# Patient Record
Sex: MALE | NOT HISPANIC OR LATINO | Employment: STUDENT | URBAN - METROPOLITAN AREA
[De-identification: names, ages, dates, MRNs, and addresses within clinical notes are randomized per-mention and may not be internally consistent; named-entity substitution may affect disease eponyms.]

---

## 2019-04-29 ENCOUNTER — HOSPITAL ENCOUNTER (OUTPATIENT)
Dept: GENERAL RADIOLOGY | Facility: HOSPITAL | Age: 19
Discharge: HOME | End: 2019-04-29
Admitting: INTERNAL MEDICINE

## 2019-04-29 ENCOUNTER — OFFICE VISIT (OUTPATIENT)
Dept: INTERNAL MEDICINE | Age: 19
End: 2019-04-29

## 2019-04-29 VITALS
HEIGHT: 70 IN | SYSTOLIC BLOOD PRESSURE: 112 MMHG | OXYGEN SATURATION: 99 % | BODY MASS INDEX: 25.77 KG/M2 | DIASTOLIC BLOOD PRESSURE: 78 MMHG | TEMPERATURE: 99 F | HEART RATE: 70 BPM | WEIGHT: 180 LBS

## 2019-04-29 DIAGNOSIS — M25.551 PAIN OF BOTH HIP JOINTS: Primary | ICD-10-CM

## 2019-04-29 DIAGNOSIS — M25.552 PAIN OF BOTH HIP JOINTS: Primary | ICD-10-CM

## 2019-04-29 PROCEDURE — 73521 X-RAY EXAM HIPS BI 2 VIEWS: CPT

## 2019-04-29 PROCEDURE — 99203 OFFICE O/P NEW LOW 30 MIN: CPT | Performed by: INTERNAL MEDICINE

## 2019-04-29 NOTE — PATIENT INSTRUCTIONS
** IMPORTANT MESSAGE FROM DR. ADKINS **    In our office, your satisfaction is VERY important to us.     You may receive a survey from [facility] by mail or E-mail for you to provide feedback about your visit. This information is invaluable for me to know what we can do to improve our services.     I ask that you please take a few minutes to complete the survey and let us know how we are doing in serving your needs. (You may receive the survey more than once for multiple visits)    Thank You !    Dr. Adkins & Staff    _____________________________________________________________________

## 2019-04-29 NOTE — PROGRESS NOTES
"[facility]  CHARLEEN ADKINS M.D.      Ady Grimes / 18 y.o. / male  04/29/2019      ASSESSMENT & PLAN:    Problem List Items Addressed This Visit     None      Visit Diagnoses     Pain of both hip joints    -  Primary    Relevant Orders    XR Hips Bilateral With or Without Pelvis 2 View        Orders Placed This Encounter   Procedures   • XR Hips Bilateral With or Without Pelvis 2 View     No orders of the defined types were placed in this encounter.      Summary/Discussion:  Check xray of bilateral hips  Avoid lower body resistance workout x 2 weeks  Take Aleve 2 BID x 5-7 days  Consider PT if not improving    Next Appointment with me: 10/23/2019    Return for worsening problem or if not improving, Annual physical.    ____________________________________________________________________    VITALS:    Visit Vitals  /78   Pulse 70   Temp 99 °F (37.2 °C)   Ht 177.8 cm (70\")   Wt 81.6 kg (180 lb)   SpO2 99%   BMI 25.83 kg/m²       BP Readings from Last 3 Encounters:   04/29/19 112/78     Wt Readings from Last 3 Encounters:   04/29/19 81.6 kg (180 lb)      Body mass index is 25.83 kg/m².    CC: Main reason(s) for today's visit: Establish Care and Leg Pain (6 months, no medications)      HPI:    Patient is a 18 y.o. male who is here to establish care. Primary complains of right anterior hip pain x 6 months and to lesser degree on the left. Works out regularly including lower body weight lifting and also plays soccer on regular basis. Denies overt history of injury or trauma. Pain is intermittent but occurs daily. Severity is mild to moderate. Externally rotating the hip seems to cause discomfort. No noticeable hernia or bulging of the groin area.       Patient Care Team:  Gerard Adkins MD as PCP - General (Internal Medicine)  ____________________________________________________________________      REVIEW OF SYSTEMS    Review of Systems   Constitutional: Negative.    HENT: Negative.    Eyes: Negative.  "   Respiratory: Negative.    Cardiovascular: Negative.    Gastrointestinal: Negative.    Endocrine: Negative.    Genitourinary: Negative.    Musculoskeletal: Negative.  Negative for back pain and joint swelling. Arthralgias: olive hip pain (r>l)   Skin: Negative.    Allergic/Immunologic: Negative.    Neurological: Negative.    Hematological: Negative.    Psychiatric/Behavioral: Negative.        PHYSICAL EXAMINATION    Physical Exam   Constitutional: He appears well-nourished. No distress.   HENT:   Head: Normocephalic.   Right Ear: Tympanic membrane normal.   Left Ear: Tympanic membrane normal.   Mouth/Throat: Oropharynx is clear and moist. No oral lesions.   Eyes: Conjunctivae are normal. No scleral icterus.   Neck: Neck supple. No tracheal deviation present. No thyromegaly present.   Cardiovascular: Normal rate, regular rhythm, normal heart sounds and intact distal pulses. Exam reveals no gallop and no friction rub.   No murmur heard.  Pulmonary/Chest: Effort normal and breath sounds normal.   Abdominal: Soft. Bowel sounds are normal. He exhibits no distension and no mass. There is no tenderness. No hernia.   Musculoskeletal: He exhibits no edema or deformity.        Right hip: He exhibits normal range of motion, normal strength, no tenderness (mild right anterior inguinal region), no swelling and no crepitus.        Left hip: Normal.        Right knee: Normal.        Left knee: Normal.   Right anterior hip pain with external rotation of the hip   Lymphadenopathy:     He has no cervical adenopathy.        Right: No supraclavicular adenopathy present.        Left: No supraclavicular adenopathy present.   Neurological: He is alert. He has normal reflexes.   Skin: Skin is intact. No rash noted. No erythema. No pallor.   No jaundice   Psychiatric: He has a normal mood and affect. His behavior is normal. Judgment and thought content normal. Cognition and memory are normal.         REVIEWED DATA:    Labs:   No results  found for: NA, K, CALCIUM, AST, ALT, BUN, CREATININE, EGFRIFNONA, EGFRIFAFRI    No results found for: GLUCOSE, HGBA1C, MICROALBUR    No results found for: LDL, HDL, TRIG, CHOLHDLRATIO    No results found for: TSH, FREET4     No results found for: WBC, HGB, PLT      Imaging:        Medical Tests:        Summary of old records / correspondence / consultant report:        Request outside records:        ______________________________________________________________________    ALLERGIES  No Known Allergies     MEDICATIONS  No current outpatient medications on file.     No current facility-administered medications for this visit.        PFSH:     The following portions of the patient's history were reviewed and updated as appropriate: Allergies / Current Medications / Past Medical History / Surgical History / Social History / Family History    PROBLEM LIST   There is no problem list on file for this patient.      PAST MEDICAL HISTORY  History reviewed. No pertinent past medical history.    SURGICAL HISTORY  Past Surgical History:   Procedure Laterality Date   • ADENOIDECTOMY         SOCIAL HISTORY  Social History     Socioeconomic History   • Marital status: Single     Spouse name: Not on file   • Number of children: Not on file   • Years of education: Not on file   • Highest education level: Not on file   Occupational History   • Occupation: [employer]/ student   Tobacco Use   • Smoking status: Current Every Day Smoker     Types: Cigarettes, Electronic Cigarette   • Smokeless tobacco: Never Used   • Tobacco comment: hookah pipe   Substance and Sexual Activity   • Alcohol use: No     Frequency: Never   • Drug use: No   • Sexual activity: No       FAMILY HISTORY  Family History   Problem Relation Age of Onset   • Hypertension Father    • Diabetes Brother    • Lung cancer Paternal Grandmother          **Bony Disclaimer:   Much of this encounter note is an electronic transcription/translation of spoken language to  printed text. The electronic translation of spoken language may permit erroneous, or at times, nonsensical words or phrases to be inadvertently transcribed. Although I have reviewed the note for such errors, some may still exist.       Template created by Fox Tong MD

## 2019-04-30 NOTE — PROGRESS NOTES
Discussed result with patient by phone. 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 Discussed with patient. Avoid work out activities that may aggravate hip pain. Call if not improving or worsening. Suggested PT if needed.   Take Rayshawn LOZANO.

## 2019-08-22 NOTE — PROGRESS NOTES
"Ady Grimes / 18 y.o. / male  Encounter Date: 08/23/2019    ASSESSMENT & PLAN:    Problem List Items Addressed This Visit     None      Visit Diagnoses     Acute hip pain, right    -  Primary    Relevant Orders    Ambulatory Referral to Physical Therapy Evaluate and treat (Completed)    Ambulatory Referral to Orthopedic Surgery        Orders Placed This Encounter   Procedures   • Ambulatory Referral to Physical Therapy Evaluate and treat   • Ambulatory Referral to Orthopedic Surgery     No orders of the defined types were placed in this encounter.      Summary/Discussion:  1. Instructed patient that he can use OTC NSAIDs for acute pain relief, along with rest and avoidance of activities that exacerbate pain. Referral placed for outpatient PT with the goal of pain management and return to sports/activities.   2. Referral placed for ortho evaluation, pending response to PT may cancel this.     Return if symptoms worsen or fail to improve, for Next scheduled follow up.  ________________________________________________________________    VITALS:    Visit Vitals  /80   Pulse 70   Temp 97.4 °F (36.3 °C) (Temporal)   Ht 177.8 cm (70\")   Wt 83.5 kg (184 lb)   SpO2 98%   BMI 26.40 kg/m²       BP Readings from Last 3 Encounters:   08/23/19 120/80   04/29/19 112/78     Wt Readings from Last 3 Encounters:   08/23/19 83.5 kg (184 lb)   04/29/19 81.6 kg (180 lb)      Body mass index is 26.4 kg/m².    CC: Main reason(s) for today's visit: Hip Pain      HPI    Patient is a 18 y.o. male who is here for R hip pain, >1 year. No acute injury or trama to the area known.  He is a new patient to me. This is a new problem to me. The pain is located in the anterior groin area on KEYANA hips, worse on the R than the L and exacerbated with movement, especially running. The pain is generally described as a constant ache, but when running or participating in high intensity activities, it is sharp and radiates to his whole upper leg and " hip, rated 8/10. He was evaluated for KEYANA hip pain back in April of this year by his PCP. At that time xrays were taken. In his visit today, we reviewed previous bilateral hip x-ray with patient and father.  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 He was instructed previously to use NSAIDs OTC as needed for pain, and to pursue physical therapy for pain improvement. He did not pursue either of these treatments at that time.      Patient Care Team:  Gerard Tong MD as PCP - General (Internal Medicine)  ____________________________________________________________________    REVIEW OF SYSTEMS    PER HPI NOTE    PHYSICAL EXAMINATION    Physical Exam   Constitutional: He is oriented to person, place, and time. He appears well-developed and well-nourished. No distress.   Musculoskeletal:        Right hip: He exhibits decreased range of motion and decreased strength.        Left hip: He exhibits decreased range of motion.   R hip anterior pain with external rotation. (R pain > L)  L hip anterior pain with external rotation.   No obvious gait abnormalities noted.    Neurological: He is alert and oriented to person, place, and time.   Psychiatric: He has a normal mood and affect. His behavior is normal. Judgment and thought content normal.   Vitals reviewed.    REVIEWED DATA:    Labs:   No results found for: NA, K, AST, ALT, BUN, CREATININE, EGFRIFNONA, EGFRIFAFRI    No results found for: GLUCOSE, HGBA1C, MICROALBUR    No results found for: LDL, HDL, TRIG, CHOLHDLRATIO    No results found for: TSH, FREET4     No results found for: WBC, HGB, PLT      Imaging:      Medical Tests:      Summary of old records / correspondence / consultant report:      Request outside records:   ______________________________________________________________________    ALLERGIES  No Known Allergies     MEDICATIONS  No current outpatient medications on file prior to visit.     No current facility-administered medications  on file prior to visit.        PFSH:     The following portions of the patient's history were reviewed and updated as appropriate: Allergies / Current Medications / Past Medical History / Surgical History / Social History / Family History    PROBLEM LIST   There is no problem list on file for this patient.      PAST MEDICAL HISTORY  No past medical history on file.    SURGICAL HISTORY  Past Surgical History:   Procedure Laterality Date   • ADENOIDECTOMY         SOCIAL HISTORY  Social History     Socioeconomic History   • Marital status: Single     Spouse name: Not on file   • Number of children: Not on file   • Years of education: Not on file   • Highest education level: Not on file   Occupational History   • Occupation: [employer]/ student   Tobacco Use   • Smoking status: Current Every Day Smoker     Types: Cigarettes, Electronic Cigarette   • Smokeless tobacco: Never Used   • Tobacco comment: hookah pipe   Substance and Sexual Activity   • Alcohol use: No     Frequency: Never   • Drug use: No   • Sexual activity: No       FAMILY HISTORY  Family History   Problem Relation Age of Onset   • Hypertension Father    • Diabetes Brother    • Lung cancer Paternal Grandmother

## 2019-08-23 ENCOUNTER — OFFICE VISIT (OUTPATIENT)
Dept: INTERNAL MEDICINE | Age: 19
End: 2019-08-23

## 2019-08-23 VITALS
HEIGHT: 70 IN | WEIGHT: 184 LBS | DIASTOLIC BLOOD PRESSURE: 80 MMHG | OXYGEN SATURATION: 98 % | BODY MASS INDEX: 26.34 KG/M2 | SYSTOLIC BLOOD PRESSURE: 120 MMHG | TEMPERATURE: 97.4 F | HEART RATE: 70 BPM

## 2019-08-23 DIAGNOSIS — M25.551 ACUTE HIP PAIN, RIGHT: Primary | ICD-10-CM

## 2019-08-23 PROCEDURE — 99213 OFFICE O/P EST LOW 20 MIN: CPT | Performed by: NURSE PRACTITIONER

## 2019-10-11 ENCOUNTER — OFFICE VISIT (OUTPATIENT)
Dept: ORTHOPEDIC SURGERY | Facility: CLINIC | Age: 19
End: 2019-10-11

## 2019-10-11 VITALS — TEMPERATURE: 98.5 F | WEIGHT: 184.2 LBS | HEIGHT: 71 IN | BODY MASS INDEX: 25.79 KG/M2

## 2019-10-11 DIAGNOSIS — M25.552 PAIN OF BOTH HIP JOINTS: Primary | ICD-10-CM

## 2019-10-11 DIAGNOSIS — M25.551 PAIN OF BOTH HIP JOINTS: Primary | ICD-10-CM

## 2019-10-11 PROCEDURE — 99203 OFFICE O/P NEW LOW 30 MIN: CPT | Performed by: ORTHOPAEDIC SURGERY

## 2019-10-11 NOTE — PROGRESS NOTES
"Patient: Ady Grimes  YOB: 2000 19 y.o. male  Medical Record Number: [id number]    Chief Complaints:   Chief Complaint   Patient presents with   • Right Hip - Establish Care, Pain       History of Present Illness:dAy Grimes is a 19 y.o. male who presents with right great left hip pain.  He has stabbing pain within the groin which is intermittent at times he will feel a pop within the hip.  It has worsened over the last year.  Denies any problems.  He is here with his mother who states that he had what sounds like transient synovitis at 1 or 2 years of age but no other childhood hip disorders that she is aware of.    Allergies: No Known Allergies    Medications:   No current outpatient medications on file.     No current facility-administered medications for this visit.          The following portions of the patient's history were reviewed and updated as appropriate: allergies, current medications, past family history, past medical history, past social history, past surgical history and problem list.    Review of Systems:   A 14 point review of systems was performed. All systems negative except pertinent positives/negative listed in HPI above    Physical Exam:   Vitals:    10/11/19 1400   Temp: 98.5 °F (36.9 °C)   TempSrc: Temporal   Weight: 83.6 kg (184 lb 3.2 oz)   Height: 180.3 cm (71\")       General: A and O x 3, ASA, NAD    SCLERA:    Normal    DENTITION:   Normal   Hip:  right    LEG ALIGNMENT:     Neutral   ,    equal leg lengths    GAIT:     Nonantalgic    SKIN:     No abnormality    RANGE OF MOTION:     NEAR Full witH joint irritability during flexion abduction and internal rotation    STRENGTH:     5 / 5    hip flexion and abduction    DISTAL PULSES:    Paplable    DISTAL SENSATION :   Intact    LYMPHATICS:     No   lymphadenopathy    OTHER:          - Negative Stinchfeld test      - Negative log roll      - No Tenderness to palpation trochanteric bursa      - pOS FADIR      - Neg " YOGESH      - No SI tenderness       Radiology:  Xrays 2views both hips (AP bilateral hips, and lateral of the hip) taken at the hospital were reviewed  demonstrating  evidence of significant impingement morphology with CAM lesion noted    Assessment/Plan: Bilateral hip femoral acetabular impingement possible labral tearing.  I am going to get MRI arthrograms and he will call for results.      Scar Kothari MD  10/11/2019

## 2019-10-15 ENCOUNTER — RESULTS ENCOUNTER (OUTPATIENT)
Dept: INTERNAL MEDICINE | Age: 19
End: 2019-10-15

## 2019-10-15 DIAGNOSIS — Z00.00 PREVENTATIVE HEALTH CARE: ICD-10-CM

## 2019-10-15 DIAGNOSIS — Z00.00 PREVENTATIVE HEALTH CARE: Primary | ICD-10-CM

## 2019-10-16 LAB
ALBUMIN SERPL-MCNC: 4.8 G/DL (ref 3.5–5.2)
ALBUMIN/GLOB SERPL: 1.8 G/DL
ALP SERPL-CCNC: 77 U/L (ref 39–117)
ALT SERPL-CCNC: 23 U/L (ref 1–41)
APPEARANCE UR: CLEAR
AST SERPL-CCNC: 19 U/L (ref 1–40)
BASOPHILS # BLD AUTO: 0.04 10*3/MM3 (ref 0–0.2)
BASOPHILS NFR BLD AUTO: 0.5 % (ref 0–1.5)
BILIRUB SERPL-MCNC: 0.9 MG/DL (ref 0.2–1.2)
BILIRUB UR QL STRIP: NEGATIVE
BUN SERPL-MCNC: 9 MG/DL (ref 6–20)
BUN/CREAT SERPL: 9.7 (ref 7–25)
CALCIUM SERPL-MCNC: 9.3 MG/DL (ref 8.6–10.5)
CHLORIDE SERPL-SCNC: 99 MMOL/L (ref 98–107)
CHOLEST SERPL-MCNC: 176 MG/DL (ref 0–200)
CHOLEST/HDLC SERPL: 3.45 {RATIO}
CO2 SERPL-SCNC: 28.2 MMOL/L (ref 22–29)
COLOR UR: YELLOW
CREAT SERPL-MCNC: 0.93 MG/DL (ref 0.76–1.27)
EOSINOPHIL # BLD AUTO: 0.2 10*3/MM3 (ref 0–0.4)
EOSINOPHIL NFR BLD AUTO: 2.4 % (ref 0.3–6.2)
ERYTHROCYTE [DISTWIDTH] IN BLOOD BY AUTOMATED COUNT: 12 % (ref 12.3–15.4)
GLOBULIN SER CALC-MCNC: 2.7 GM/DL
GLUCOSE SERPL-MCNC: 84 MG/DL (ref 65–99)
GLUCOSE UR QL: NEGATIVE
HBA1C MFR BLD: 4.9 % (ref 4.8–5.6)
HCT VFR BLD AUTO: 48.3 % (ref 37.5–51)
HDLC SERPL-MCNC: 51 MG/DL (ref 40–60)
HGB BLD-MCNC: 16.3 G/DL (ref 13–17.7)
HGB UR QL STRIP: NEGATIVE
IMM GRANULOCYTES # BLD AUTO: 0.02 10*3/MM3 (ref 0–0.05)
IMM GRANULOCYTES NFR BLD AUTO: 0.2 % (ref 0–0.5)
KETONES UR QL STRIP: NEGATIVE
LDLC SERPL CALC-MCNC: 111 MG/DL (ref 0–100)
LEUKOCYTE ESTERASE UR QL STRIP: NEGATIVE
LYMPHOCYTES # BLD AUTO: 1.54 10*3/MM3 (ref 0.7–3.1)
LYMPHOCYTES NFR BLD AUTO: 18.7 % (ref 19.6–45.3)
MCH RBC QN AUTO: 30 PG (ref 26.6–33)
MCHC RBC AUTO-ENTMCNC: 33.7 G/DL (ref 31.5–35.7)
MCV RBC AUTO: 89 FL (ref 79–97)
MONOCYTES # BLD AUTO: 0.67 10*3/MM3 (ref 0.1–0.9)
MONOCYTES NFR BLD AUTO: 8.1 % (ref 5–12)
NEUTROPHILS # BLD AUTO: 5.76 10*3/MM3 (ref 1.7–7)
NEUTROPHILS NFR BLD AUTO: 70.1 % (ref 42.7–76)
NITRITE UR QL STRIP: NEGATIVE
NRBC BLD AUTO-RTO: 0 /100 WBC (ref 0–0.2)
PH UR STRIP: 6 [PH] (ref 5–8)
PLATELET # BLD AUTO: 180 10*3/MM3 (ref 140–450)
POTASSIUM SERPL-SCNC: 4.1 MMOL/L (ref 3.5–5.2)
PROT SERPL-MCNC: 7.5 G/DL (ref 6–8.5)
PROT UR QL STRIP: NEGATIVE
RBC # BLD AUTO: 5.43 10*6/MM3 (ref 4.14–5.8)
SODIUM SERPL-SCNC: 139 MMOL/L (ref 136–145)
SP GR UR: 1.02 (ref 1–1.03)
T4 FREE SERPL-MCNC: 1.36 NG/DL (ref 0.93–1.7)
TRIGL SERPL-MCNC: 70 MG/DL (ref 0–150)
TSH SERPL DL<=0.005 MIU/L-ACNC: 1.13 UIU/ML (ref 0.27–4.2)
UROBILINOGEN UR STRIP-MCNC: NORMAL MG/DL
VLDLC SERPL CALC-MCNC: 14 MG/DL
WBC # BLD AUTO: 8.23 10*3/MM3 (ref 3.4–10.8)

## 2019-10-23 ENCOUNTER — OFFICE VISIT (OUTPATIENT)
Dept: INTERNAL MEDICINE | Age: 19
End: 2019-10-23

## 2019-10-23 VITALS
TEMPERATURE: 97.8 F | HEIGHT: 71 IN | SYSTOLIC BLOOD PRESSURE: 120 MMHG | OXYGEN SATURATION: 98 % | WEIGHT: 186 LBS | DIASTOLIC BLOOD PRESSURE: 82 MMHG | HEART RATE: 72 BPM | BODY MASS INDEX: 26.04 KG/M2

## 2019-10-23 DIAGNOSIS — Z00.00 ENCOUNTER FOR ANNUAL HEALTH EXAMINATION: Primary | ICD-10-CM

## 2019-10-23 DIAGNOSIS — Z23 ENCOUNTER FOR IMMUNIZATION: ICD-10-CM

## 2019-10-23 PROCEDURE — 99395 PREV VISIT EST AGE 18-39: CPT | Performed by: INTERNAL MEDICINE

## 2019-10-23 PROCEDURE — 90715 TDAP VACCINE 7 YRS/> IM: CPT | Performed by: INTERNAL MEDICINE

## 2019-10-23 PROCEDURE — 90471 IMMUNIZATION ADMIN: CPT | Performed by: INTERNAL MEDICINE

## 2019-10-23 PROCEDURE — 90674 CCIIV4 VAC NO PRSV 0.5 ML IM: CPT | Performed by: INTERNAL MEDICINE

## 2019-10-23 PROCEDURE — 90472 IMMUNIZATION ADMIN EACH ADD: CPT | Performed by: INTERNAL MEDICINE

## 2019-10-23 RX ORDER — GUAIFENESIN 600 MG/1
1200 TABLET, EXTENDED RELEASE ORAL 2 TIMES DAILY
COMMUNITY
End: 2020-09-04

## 2019-10-23 NOTE — PROGRESS NOTES
[facility] [place]  CHARLEEN ADKINS M.D.      Ady BackKhoury / 19 y.o. / male  10/23/2019    CHIEF COMPLAINT     Annual Exam      HISTORY OF PRESENT ILLNESS      Reviewed chief complaint and details of complaint as documented by staff.     Ady presents for annual health maintenance visit.  Saw Dr. Kothari for hip pain and MRI was recommended. He is going to [place] orthopedist for 2nd opinion.   Left forearm fracture from fall and in cast.     · Last health maintenance visit: unsure  · General health: good  · Lifestyle:  · Attempting to lose weight?: No   · Diet: eats decently  · Exercise: limited physical activity due to health/physical limitations  · Tobacco: Occasional/Social use and uses smokeless nicotine   · Alcohol: occasional/rare  · Work: Part-time and Student  · Reproductive health:  · Sexually active?: No   · Concern for STD?: No   · Sexual problems?: No problems   · Sees Urologist?: No   · Depression Screening:      PHQ-2/PHQ-9 Depression Screening 4/29/2019   Little interest or pleasure in doing things 0   Feeling down, depressed, or hopeless 0   Total Score 0         PHQ-2: 0 (Not depressed)     PHQ-9:     Patient Care Team:  Gerard Adkins MD as PCP - General (Internal Medicine)  ______________________________________________________________________    ALLERGIES  No Known Allergies     MEDICATIONS  Current Outpatient Medications   Medication Sig Dispense Refill   • guaiFENesin (MUCINEX) 600 MG 12 hr tablet Take 1,200 mg by mouth 2 (Two) Times a Day.       No current facility-administered medications for this visit.        PFSH:     The following portions of the patient's history were reviewed and updated as appropriate: Allergies / Current Medications / Past Medical History / Surgical History / Social History / Family History    PROBLEM LIST   There is no problem list on file for this patient.      PAST MEDICAL HISTORY  History reviewed. No pertinent past medical history.    SURGICAL HISTORY  Past Surgical  "History:   Procedure Laterality Date   • APPENDECTOMY         SOCIAL HISTORY  Social History     Socioeconomic History   • Marital status: Single     Spouse name: Not on file   • Number of children: Not on file   • Years of education: Not on file   • Highest education level: Not on file   Occupational History   • Occupation: [employer]/ student   Tobacco Use   • Smoking status: Current Every Day Smoker     Types: Electronic Cigarette   • Smokeless tobacco: Never Used   • Tobacco comment: hookah pipe   Substance and Sexual Activity   • Alcohol use: No     Frequency: Never   • Drug use: No   • Sexual activity: No       FAMILY HISTORY  Family History   Problem Relation Age of Onset   • Hypertension Father    • Diabetes Brother    • Lung cancer Paternal Grandmother        IMMUNIZATION HISTORY  Immunization History   Administered Date(s) Administered   • Tdap 10/23/2019   • flucelvax quad pfs =>4 YRS 10/23/2019       ______________________________________________________________________    REVIEW OF SYSTEMS     Review of Systems   Constitutional: Negative.    HENT: Negative.    Eyes: Negative.    Respiratory: Negative.    Cardiovascular: Negative.    Gastrointestinal: Negative.    Endocrine: Negative.    Genitourinary: Negative.    Musculoskeletal: Negative.         Hip pain, left arm fracture in cast   Skin: Negative.    Allergic/Immunologic: Negative.    Neurological: Negative.    Hematological: Negative.    Psychiatric/Behavioral: Negative.          VITALS     Visit Vitals  /82   Pulse 72   Temp 97.8 °F (36.6 °C)   Ht 180.3 cm (71\")   Wt 84.4 kg (186 lb)   SpO2 98%   BMI 25.94 kg/m²       BP Readings from Last 3 Encounters:   10/23/19 120/82   08/23/19 120/80   04/29/19 112/78     Wt Readings from Last 3 Encounters:   10/23/19 84.4 kg (186 lb)   10/11/19 83.6 kg (184 lb 3.2 oz)   08/23/19 83.5 kg (184 lb)      Body mass index is 25.94 kg/m².    PHYSICAL EXAMINATION     Physical Exam   Constitutional: He is " oriented to person, place, and time. He appears well-developed and well-nourished. No distress.   HENT:   Head: Normocephalic and atraumatic.   Right Ear: External ear normal.   Left Ear: External ear normal.   Nose: Nose normal.   Mouth/Throat: Oropharynx is clear and moist.   Eyes: Conjunctivae and EOM are normal. Pupils are equal, round, and reactive to light. No scleral icterus.   Neck: Normal range of motion. Neck supple. No tracheal deviation present. No thyroid mass and no thyromegaly present.   Cardiovascular: Normal rate, regular rhythm, normal heart sounds and intact distal pulses.   Pulmonary/Chest: Effort normal and breath sounds normal.   Abdominal: Soft. Normal appearance and bowel sounds are normal. He exhibits no distension and no mass. There is no hepatosplenomegaly. There is no tenderness. No hernia.   Musculoskeletal: Normal range of motion.   Left forearm in cast    Lymphadenopathy:     He has no cervical adenopathy.     He has no axillary adenopathy.        Right: No inguinal and no supraclavicular adenopathy present.        Left: No inguinal and no supraclavicular adenopathy present.   Neurological: He is alert and oriented to person, place, and time. He has normal reflexes. No cranial nerve deficit. He exhibits normal muscle tone.   Skin: Skin is warm. No lesion (Negative for suspicious skin lesions/growths) and no rash noted. No pallor.   No jaundice  No suspicious skin lesions.    Psychiatric: He has a normal mood and affect. His behavior is normal. Judgment and thought content normal.         REVIEWED DATA      Labs:    Lab Results   Component Value Date     10/16/2019    K 4.1 10/16/2019    CALCIUM 9.3 10/16/2019    AST 19 10/16/2019    ALT 23 10/16/2019    BUN 9 10/16/2019    CREATININE 0.93 10/16/2019    EGFRIFNONA 103 10/16/2019    EGFRIFAFRI 124 10/16/2019       Lab Results   Component Value Date    GLU 84 10/16/2019    HGBA1C 4.90 10/16/2019    TSH 1.130 10/16/2019    FREET4  1.36 10/16/2019       No results found for: PSA, TESTOSTEROTT    Lab Results   Component Value Date     (H) 10/16/2019    HDL 51 10/16/2019    TRIG 70 10/16/2019    CHOLHDLRATIO 3.45 10/16/2019       No components found for: ARRM637M    Lab Results   Component Value Date    WBC 8.23 10/16/2019    HGB 16.3 10/16/2019    MCV 89.0 10/16/2019     10/16/2019       Lab Results   Component Value Date    PROTEIN Negative 10/16/2019    GLUCOSEU Negative 10/16/2019    BLOODU Negative 10/16/2019    NITRITEU Negative 10/16/2019    LEUKOCYTESUR Negative 10/16/2019        No results found for: HEPCVIRUSABY    Imaging:           Medical Tests:       ______________________________________________________________________    ASSESSMENT & PLAN     ANNUAL WELLNESS EXAM / PHYSICAL     Other medical problems addressed today:  Problem List Items Addressed This Visit     None      Visit Diagnoses     Encounter for annual health examination    -  Primary    Relevant Orders    Tdap Vaccine Greater Than or Equal To 6yo IM (Completed)    Encounter for immunization        Relevant Orders    Flucelvax Quad=>4Years (0764-2078) (Completed)          Summary/Discussion:     ·     Next Appointment with me: Visit date not found    No Follow-up on file.      HEALTHCARE MAINTENANCE ISSUES       Cancer Screening:  · Colon: Initial/Next screening at age: 45  · Repeat colon cancer screening: N/A at this time  · Prostate: No screening needed at this time  · Testicular: Recommended monthly self exam  · Skin: Monthly self skin examination, annual exam by health professional  · Lung: Does not meet criteria for lung cancer screening.   · Other:    Screening Labs & Tests:  · Lab results reviewed & discussed with with patient or orders placed today.  · EKG:  · CV Screening: No special screening needed  · DEXA (75+ or risk factors):   · HEP C (If born [birth year]19-[birth year]34 or risk factors): Not indicated    Immunization/Vaccinations (to be given today unless  deferred by patient)  · Influenza: Give flu shot today  · Hepatitis A: Verify immunization records  · Tetanus/Pertussis: Administer today  · Pneumovax: Not needed at this time  · Shingles: Not needed at this time  · Other:     Lifestyle Counseling:  · Lifestyle Modifications: Follow a low fat, low cholesterol diet, Quit smoking and Discussed safe sex practices, contraception  · Safety Issues: Always wear seatbelt, Avoid texting while driving   · Use sunscreen, regular skin examination  · Recommended annual dental/vision examination.  · Emotional/Stress/Sleep: Reviewed and  given when appropriate      Health Maintenance   Topic Date Due   • ANNUAL PHYSICAL  09/10/2001   • HPV VACCINES (1 - Male 3-dose series) 09/10/2013   • MENINGOCOCCAL VACCINE (Normal Risk) (1 - 2-dose series) 09/10/2014   • PNEUMOCOCCAL VACCINE (19-64 MEDIUM RISK) (1 of 1 - PPSV23) 09/10/2017   • TDAP/TD VACCINES (1 - Tdap) 09/10/2017   • INFLUENZA VACCINE  Completed         **Dragon Disclaimer:   Much of this encounter note is an electronic transcription/translation of spoken language to printed text. The electronic translation of spoken language may permit erroneous, or at times, nonsensical words or phrases to be inadvertently transcribed. Although I have reviewed the note for such errors, some may still exist.       Template created by Fox Tong MD

## 2019-10-23 NOTE — PATIENT INSTRUCTIONS
** IMPORTANT MESSAGE FROM DR. ADKINS **    In our office, your satisfaction is VERY important to us.     You may receive a survey from [employer] by mail or E-mail for you to provide feedback about your visit. This information is invaluable for me to know what we can do to improve our services.     I ask that you please take a few minutes to complete the survey and let us know how we are doing in serving your needs. (You may receive the survey more than once for multiple visits)    Thank You !    Dr. Adkins & Staff    __________________________________________________________      ADDITIONAL INSTRUCTION / REMINDERS FROM DR. ADKINS

## 2019-10-24 ENCOUNTER — TELEPHONE (OUTPATIENT)
Dept: ORTHOPEDIC SURGERY | Facility: CLINIC | Age: 19
End: 2019-10-24

## 2021-04-16 ENCOUNTER — BULK ORDERING (OUTPATIENT)
Dept: CASE MANAGEMENT | Facility: OTHER | Age: 21
End: 2021-04-16

## 2021-04-16 DIAGNOSIS — Z23 IMMUNIZATION DUE: ICD-10-CM

## 2023-09-28 ENCOUNTER — TRANSCRIBE ORDERS (OUTPATIENT)
Dept: ADMINISTRATIVE | Facility: HOSPITAL | Age: 23
End: 2023-09-28
Payer: COMMERCIAL

## 2023-09-28 DIAGNOSIS — M25.552 LEFT HIP PAIN: Primary | ICD-10-CM

## 2023-10-11 ENCOUNTER — HOSPITAL ENCOUNTER (OUTPATIENT)
Dept: GENERAL RADIOLOGY | Facility: HOSPITAL | Age: 23
Discharge: HOME | End: 2023-10-11
Payer: COMMERCIAL

## 2023-10-11 ENCOUNTER — HOSPITAL ENCOUNTER (OUTPATIENT)
Dept: MRI IMAGING | Facility: HOSPITAL | Age: 23
Discharge: HOME | End: 2023-10-11
Payer: COMMERCIAL

## 2023-10-11 ENCOUNTER — APPOINTMENT (OUTPATIENT)
Dept: OTHER | Facility: HOSPITAL | Age: 23
End: 2023-10-11
Payer: COMMERCIAL

## 2023-10-11 DIAGNOSIS — M25.552 LEFT HIP PAIN: ICD-10-CM

## 2023-10-11 DIAGNOSIS — Z09 FOLLOW-UP EXAM: ICD-10-CM

## 2023-10-11 PROCEDURE — 77002 NEEDLE LOCALIZATION BY XRAY: CPT

## 2023-10-11 PROCEDURE — 25010000002 LIDOCAINE 1 % SOLUTION: Performed by: ORTHOPAEDIC SURGERY

## 2023-10-11 PROCEDURE — 0 GADOBENATE DIMEGLUMINE 529 MG/ML SOLUTION: Performed by: ORTHOPAEDIC SURGERY

## 2023-10-11 PROCEDURE — A9577 INJ MULTIHANCE: HCPCS | Performed by: ORTHOPAEDIC SURGERY

## 2023-10-11 PROCEDURE — 25510000001 IOPAMIDOL 61 % SOLUTION: Performed by: ORTHOPAEDIC SURGERY

## 2023-10-11 PROCEDURE — 73722 MRI JOINT OF LWR EXTR W/DYE: CPT

## 2023-10-11 RX ORDER — LIDOCAINE HYDROCHLORIDE 10 MG/ML
20 INJECTION, SOLUTION INFILTRATION; PERINEURAL ONCE
Status: COMPLETED | OUTPATIENT
Start: 2023-10-11 | End: 2023-10-11

## 2023-10-11 RX ADMIN — IOPAMIDOL 10 ML: 612 INJECTION, SOLUTION INTRAVENOUS at 08:38

## 2023-10-11 RX ADMIN — LIDOCAINE HYDROCHLORIDE 3 ML: 10 INJECTION, SOLUTION INFILTRATION; PERINEURAL at 08:38

## 2023-10-11 RX ADMIN — GADOBENATE DIMEGLUMINE 0.1 ML: 529 INJECTION, SOLUTION INTRAVENOUS at 08:38

## 2023-12-07 ENCOUNTER — OFFICE VISIT (OUTPATIENT)
Dept: ORTHOPEDIC SURGERY | Facility: CLINIC | Age: 23
End: 2023-12-07
Payer: COMMERCIAL

## 2023-12-07 VITALS — WEIGHT: 218.5 LBS | HEIGHT: 70 IN | TEMPERATURE: 97.7 F | BODY MASS INDEX: 31.28 KG/M2

## 2023-12-07 DIAGNOSIS — M25.552 LEFT HIP PAIN: Primary | ICD-10-CM

## 2023-12-07 PROCEDURE — 99203 OFFICE O/P NEW LOW 30 MIN: CPT | Performed by: ORTHOPAEDIC SURGERY

## 2023-12-07 RX ORDER — MELOXICAM 15 MG/1
15 TABLET ORAL DAILY
COMMUNITY

## 2023-12-07 NOTE — PROGRESS NOTES
"Patient: Ady Grimes  YOB: 2000 23 y.o. male  Medical Record Number: [id number]    Chief Complaints:   Chief Complaint   Patient presents with    Left Hip - Initial Evaluation       History of Present Illness:Ady Grimes is a 23 y.o. male who presents with  left hip pain - h/o scope , labral repair and osteoplasty by Dr. Prince Hawk both hips left with most recent about a year and a half ago few months ago he had a flareup had another MRI arthrogram shows no evidence of labral tearing following that he had a steroid shot which has helped.  He has a mild constant ache in the left hip currently.    Allergies: No Known Allergies    Medications:   Current Outpatient Medications   Medication Sig Dispense Refill    meloxicam (MOBIC) 15 MG tablet Take 1 tablet by mouth Daily.       No current facility-administered medications for this visit.         The following portions of the patient's history were reviewed and updated as appropriate: allergies, current medications, past family history, past medical history, past social history, past surgical history and problem list.    Review of Systems:   Pertinent positives/negative listed in HPI above    Physical Exam:   Vitals:    12/07/23 1436   Temp: 97.7 °F (36.5 °C)   Weight: 99.1 kg (218 lb 8 oz)   Height: 177.8 cm (70\")   PainSc:   5   PainLoc: Hip       General: A and O x 3, ASA, NAD      Hip:  left    LEG ALIGNMENT:     Neutral   ,    equal leg lengths    GAIT:     Nonantalgic    SKIN:     No abnormality    RANGE OF MOTION:      Full without joint irritability    STRENGTH:     5 / 5    hip flexion and abduction    DISTAL PULSES:    Paplable    DISTAL SENSATION :   Intact    LYMPHATICS:     No   lymphadenopathy    OTHER:          - Negative Stinchfeld test      - Negative log roll      - No Tenderness to palpation trochanteric bursa      - Neg FADIR      - Neg YOGESH      - No SI tenderness         Radiology:  Xrays 2views left hip (AP bilateral hips, and " lateral of the hip) ordered and reviewed for evaluation of hip pain  demonstrating good preservation of joint space.  There is marginal osteophyte formation there is evidence of previous cam lesion which appears to have been resected on the left side.  I have no previous films or comparison.    Assessment/Plan: Froilan hip pain -  has some marginal osteophtytes -  Previous hip arthroscopy labral repair and osteochondral plasty at this point I think BEEN appropriate I would not recommend any other change meloxicam or Tylenol as needed.  I will see him back as needed      Diagnoses and all orders for this visit:    1. Left hip pain (Primary)  -     XR Hip With or Without Pelvis 2 - 3 View Left        Scar Kothari MD  12/7/2023

## 2024-02-05 ENCOUNTER — TELEPHONE (OUTPATIENT)
Dept: INTERNAL MEDICINE | Age: 24
End: 2024-02-05
Payer: COMMERCIAL

## 2024-02-05 DIAGNOSIS — Z00.00 PREVENTATIVE HEALTH CARE: Primary | ICD-10-CM

## 2024-02-05 NOTE — TELEPHONE ENCOUNTER
Caller: Ady Grimes    Relationship to patient: Self    Best call back number: [phone]     Patient is needing: BEEN MORE THAN 3 YEARS SINCE PATIENT WAS SEEN, PATIENT IS WANTING TO RE-ESTABLISH CARE WITH DR ADKINS AND SCHEDULE A PHYSICAL. PLEASE CALL AND ADVISE IF DR ADKINS IS ACCEPTING TO TAKE HIM BACK ON AS A PATIENT.

## 2024-02-12 ENCOUNTER — OFFICE VISIT (OUTPATIENT)
Dept: INTERNAL MEDICINE | Age: 24
End: 2024-02-12
Payer: COMMERCIAL

## 2024-02-12 VITALS
HEART RATE: 88 BPM | OXYGEN SATURATION: 98 % | WEIGHT: 226 LBS | HEIGHT: 70 IN | DIASTOLIC BLOOD PRESSURE: 96 MMHG | TEMPERATURE: 97.1 F | SYSTOLIC BLOOD PRESSURE: 148 MMHG | BODY MASS INDEX: 32.35 KG/M2

## 2024-02-12 DIAGNOSIS — M25.552 CHRONIC LEFT HIP PAIN: ICD-10-CM

## 2024-02-12 DIAGNOSIS — E66.09 CLASS 1 OBESITY DUE TO EXCESS CALORIES WITH SERIOUS COMORBIDITY AND BODY MASS INDEX (BMI) OF 32.0 TO 32.9 IN ADULT: ICD-10-CM

## 2024-02-12 DIAGNOSIS — R03.0 ELEVATED BP WITHOUT DIAGNOSIS OF HYPERTENSION: ICD-10-CM

## 2024-02-12 DIAGNOSIS — R74.01 ELEVATED ALT MEASUREMENT: ICD-10-CM

## 2024-02-12 DIAGNOSIS — E78.6 LOW HDL (UNDER 40): ICD-10-CM

## 2024-02-12 DIAGNOSIS — Z00.00 ENCOUNTER FOR ANNUAL HEALTH EXAMINATION: Primary | ICD-10-CM

## 2024-02-12 DIAGNOSIS — G89.29 CHRONIC LEFT HIP PAIN: ICD-10-CM

## 2024-02-12 PROBLEM — E66.811 CLASS 1 OBESITY DUE TO EXCESS CALORIES WITH SERIOUS COMORBIDITY AND BODY MASS INDEX (BMI) OF 32.0 TO 32.9 IN ADULT: Chronic | Status: ACTIVE | Noted: 2024-02-12

## 2024-02-12 PROBLEM — E66.811 CLASS 1 OBESITY DUE TO EXCESS CALORIES WITH SERIOUS COMORBIDITY AND BODY MASS INDEX (BMI) OF 32.0 TO 32.9 IN ADULT: Status: ACTIVE | Noted: 2024-02-12

## 2024-02-12 PROCEDURE — 99385 PREV VISIT NEW AGE 18-39: CPT | Performed by: INTERNAL MEDICINE

## 2024-02-12 PROCEDURE — 99214 OFFICE O/P EST MOD 30 MIN: CPT | Performed by: INTERNAL MEDICINE

## 2024-05-14 ENCOUNTER — OFFICE VISIT (OUTPATIENT)
Dept: INTERNAL MEDICINE | Age: 24
End: 2024-05-14
Payer: COMMERCIAL

## 2024-05-14 VITALS
HEIGHT: 70 IN | WEIGHT: 222 LBS | OXYGEN SATURATION: 98 % | BODY MASS INDEX: 31.78 KG/M2 | TEMPERATURE: 97.1 F | SYSTOLIC BLOOD PRESSURE: 140 MMHG | HEART RATE: 68 BPM | DIASTOLIC BLOOD PRESSURE: 90 MMHG

## 2024-05-14 DIAGNOSIS — M25.552 CHRONIC LEFT HIP PAIN: ICD-10-CM

## 2024-05-14 DIAGNOSIS — R74.01 ELEVATED ALT MEASUREMENT: ICD-10-CM

## 2024-05-14 DIAGNOSIS — G89.29 CHRONIC LEFT HIP PAIN: ICD-10-CM

## 2024-05-14 DIAGNOSIS — E66.09 CLASS 1 OBESITY DUE TO EXCESS CALORIES WITH SERIOUS COMORBIDITY AND BODY MASS INDEX (BMI) OF 31.0 TO 31.9 IN ADULT: Chronic | ICD-10-CM

## 2024-05-14 DIAGNOSIS — E78.6 LOW HDL (UNDER 40): Primary | ICD-10-CM

## 2024-05-14 DIAGNOSIS — R03.0 ELEVATED BP WITHOUT DIAGNOSIS OF HYPERTENSION: ICD-10-CM

## 2024-05-14 PROBLEM — E78.5 DYSLIPIDEMIA (HIGH LDL; LOW HDL): Chronic | Status: ACTIVE | Noted: 2024-02-12

## 2024-05-14 LAB
ALBUMIN SERPL-MCNC: 4.4 G/DL (ref 3.5–5.2)
ALBUMIN/GLOB SERPL: 1.8 G/DL
ALP SERPL-CCNC: 72 U/L (ref 39–117)
ALT SERPL-CCNC: 27 U/L (ref 1–41)
AST SERPL-CCNC: 27 U/L (ref 1–40)
BILIRUB SERPL-MCNC: 0.5 MG/DL (ref 0–1.2)
BUN SERPL-MCNC: 10 MG/DL (ref 6–20)
BUN/CREAT SERPL: 12.5 (ref 7–25)
CALCIUM SERPL-MCNC: 9.1 MG/DL (ref 8.6–10.5)
CHLORIDE SERPL-SCNC: 103 MMOL/L (ref 98–107)
CHOLEST SERPL-MCNC: 172 MG/DL (ref 0–200)
CHOLEST/HDLC SERPL: 4.53 {RATIO}
CO2 SERPL-SCNC: 26.6 MMOL/L (ref 22–29)
CREAT SERPL-MCNC: 0.8 MG/DL (ref 0.76–1.27)
EGFRCR SERPLBLD CKD-EPI 2021: 126 ML/MIN/1.73
GLOBULIN SER CALC-MCNC: 2.5 GM/DL
GLUCOSE SERPL-MCNC: 96 MG/DL (ref 65–99)
HDLC SERPL-MCNC: 38 MG/DL (ref 40–60)
LDLC SERPL CALC-MCNC: 124 MG/DL (ref 0–100)
POTASSIUM SERPL-SCNC: 4.7 MMOL/L (ref 3.5–5.2)
PROT SERPL-MCNC: 6.9 G/DL (ref 6–8.5)
SODIUM SERPL-SCNC: 139 MMOL/L (ref 136–145)
TRIGL SERPL-MCNC: 50 MG/DL (ref 0–150)
VLDLC SERPL CALC-MCNC: 10 MG/DL (ref 5–40)

## 2024-05-14 PROCEDURE — 99214 OFFICE O/P EST MOD 30 MIN: CPT | Performed by: INTERNAL MEDICINE

## 2024-05-14 RX ORDER — CELECOXIB 200 MG/1
1 CAPSULE ORAL DAILY
COMMUNITY
Start: 2024-04-17

## 2024-05-14 NOTE — ASSESSMENT & PLAN NOTE
BP Readings from Last 3 Encounters:   05/14/24 140/90   02/12/24 148/96   09/04/20 139/88      Blood pressure remains marginal here but reportedly better at home.  Continue lifestyle modifications with weight loss, increase physical activity and low-sodium diet.

## 2024-05-14 NOTE — ASSESSMENT & PLAN NOTE
Lab Results   Component Value Date    LDL 93 02/07/2024     (H) 10/16/2019    TRIG 156 (H) 02/07/2024    CHOLHDLRATIO 5.48 02/07/2024       Check lipid panel today.  Maintain low saturated fat diet and continue to work on weight loss.

## 2024-05-14 NOTE — PROGRESS NOTES
I N T E R N A L  M E D I C I N E    J U N O H  K I M,  M D      ENCOUNTER DATE:  05/14/2024    Ady Grimes / 23 y.o. / male    CHIEF COMPLAINT / REASON FOR OFFICE VISIT     Elevated BP without diagnosis of hypertension, Obesity, and Chronic left hip pain      ASSESSMENT & PLAN     Problem List Items Addressed This Visit          High    Elevated BP without diagnosis of hypertension (Chronic)    Current Assessment & Plan     BP Readings from Last 3 Encounters:   05/14/24 140/90   02/12/24 148/96   09/04/20 139/88      Blood pressure remains marginal here but reportedly better at home.  Continue lifestyle modifications with weight loss, increase physical activity and low-sodium diet.            Medium    Dyslipidemia (high LDL; low HDL) - Primary (Chronic)    Current Assessment & Plan     Lab Results   Component Value Date    LDL 93 02/07/2024     (H) 10/16/2019    TRIG 156 (H) 02/07/2024    CHOLHDLRATIO 5.48 02/07/2024       Check lipid panel today.  Maintain low saturated fat diet and continue to work on weight loss.            Low    Class 1 obesity due to excess calories with serious comorbidity and body mass index (BMI) of 31.0 to 31.9 in adult (Chronic)    Current Assessment & Plan     Wt Readings from Last 3 Encounters:   05/14/24 101 kg (222 lb)   02/12/24 103 kg (226 lb)   12/07/23 99.1 kg (218 lb 8 oz)     Body mass index is 31.85 kg/m².    Mild weight loss noted with improved lifestyle modifications.         Chronic left hip pain (Chronic)    Overview     Taking celecoxib 200 mg daily (orthopedist).   Discussed precautions for GRIMES-2 inhibitors.   Monitor blood pressure, GI side effects and kidney function.         Elevated ALT measurement    Current Assessment & Plan     Recheck labs today.  Continue weight loss efforts and avoidance of alcohol.         Relevant Orders    Comprehensive Metabolic Panel     Orders Placed This Encounter   Procedures    Comprehensive Metabolic Panel    Lipid Panel  "With / Chol / HDL Ratio     No orders of the defined types were placed in this encounter.      SUMMARY/DISCUSSION      Next Appointment with me: Visit date not found    Return in about 6 months (around 11/14/2024) for Reassess chronic medical problems.      VITAL SIGNS     Vitals:    05/14/24 0918   BP: 140/90   Pulse: 68   Temp: 97.1 °F (36.2 °C)   SpO2: 98%   Weight: 101 kg (222 lb)   Height: 177.8 cm (70\")       BP Readings from Last 3 Encounters:   05/14/24 140/90   02/12/24 148/96   09/04/20 139/88     Wt Readings from Last 3 Encounters:   05/14/24 101 kg (222 lb)   02/12/24 103 kg (226 lb)   12/07/23 99.1 kg (218 lb 8 oz)     Body mass index is 31.85 kg/m².    Blood pressure readings recorded on patient flowsheet:       No data to display                  MEDICATIONS AT THE TIME OF OFFICE VISIT     Current Outpatient Medications on File Prior to Visit   Medication Sig    celecoxib (CeleBREX) 200 MG capsule Take 1 capsule by mouth Daily.    [DISCONTINUED] meloxicam (MOBIC) 15 MG tablet Take 1 tablet by mouth Daily.     No current facility-administered medications on file prior to visit.          HISTORY OF PRESENT ILLNESS     Ady has been working to improve his diet and lifestyle choices.  He has quit smoking and is minimizing alcohol.  Weight loss is noted.  He does take celecoxib 200 mg daily for chronic left hip pain as prescribed by his orthopedic surgeon.  Denies any significant GI side effects.  Blood pressure at home currently is less than 130/80 but remains mildly elevated here.  Previously HDL was noted to be significantly lower than baseline.  He does report to eating at [employer] and [employer] on regular basis.    Patient Care Team:  Gerard Tong MD as PCP - General (Internal Medicine)    REVIEW OF SYSTEMS     Review of Systems       PHYSICAL EXAMINATION     Physical Exam  Alert with normal thought and judgment.   Mild weight loss noted       REVIEWED DATA     Labs:     Lab Results   Component " "Value Date     02/07/2024    K 4.2 02/07/2024    CALCIUM 8.9 02/07/2024    AST 40 02/07/2024    ALT 63 (H) 02/07/2024    BUN 7 02/07/2024    CREATININE 0.80 02/07/2024    CREATININE 0.75 04/05/2022    CREATININE 0.9 11/20/2019    EGFRRESULT 126.0 02/07/2024       Lab Results   Component Value Date    HGBA1C 5.00 02/07/2024    HGBA1C 4.90 10/16/2019       Lab Results   Component Value Date    LDL 93 02/07/2024     (H) 10/16/2019    HDL 27 (L) 02/07/2024    HDL 51 10/16/2019    TRIG 156 (H) 02/07/2024    TRIG 70 10/16/2019       Lab Results   Component Value Date    TSH 0.983 02/07/2024    TSH 1.130 10/16/2019    FREET4 1.23 02/07/2024    FREET4 1.36 10/16/2019       Lab Results   Component Value Date    WBC 6.92 02/07/2024    HGB 15.1 02/07/2024     02/07/2024       No results found for: \"MALBCRERATIO\"              Imaging:               Medical Tests:               Summary of old records / correspondence / consultant report:             Request outside records:       "

## 2024-05-14 NOTE — ASSESSMENT & PLAN NOTE
Wt Readings from Last 3 Encounters:   05/14/24 101 kg (222 lb)   02/12/24 103 kg (226 lb)   12/07/23 99.1 kg (218 lb 8 oz)     Body mass index is 31.85 kg/m².    Mild weight loss noted with improved lifestyle modifications.

## 2025-04-03 ENCOUNTER — TELEPHONE (OUTPATIENT)
Dept: INTERNAL MEDICINE | Age: 25
End: 2025-04-03
Payer: COMMERCIAL

## 2025-04-07 ENCOUNTER — OFFICE VISIT (OUTPATIENT)
Dept: INTERNAL MEDICINE | Age: 25
End: 2025-04-07
Payer: COMMERCIAL

## 2025-04-07 ENCOUNTER — TELEPHONE (OUTPATIENT)
Dept: INTERNAL MEDICINE | Age: 25
End: 2025-04-07

## 2025-04-07 VITALS
WEIGHT: 225 LBS | HEIGHT: 70 IN | SYSTOLIC BLOOD PRESSURE: 126 MMHG | OXYGEN SATURATION: 99 % | TEMPERATURE: 97.1 F | HEART RATE: 75 BPM | BODY MASS INDEX: 32.21 KG/M2 | DIASTOLIC BLOOD PRESSURE: 82 MMHG

## 2025-04-07 DIAGNOSIS — S73.199A TEAR OF ACETABULAR LABRUM, UNSPECIFIED LATERALITY, INITIAL ENCOUNTER: ICD-10-CM

## 2025-04-07 DIAGNOSIS — M24.159 LABRAL TEAR OF HIP, DEGENERATIVE: ICD-10-CM

## 2025-04-07 DIAGNOSIS — G89.29 CHRONIC HIP PAIN, BILATERAL: Primary | Chronic | ICD-10-CM

## 2025-04-07 DIAGNOSIS — M25.552 CHRONIC HIP PAIN, BILATERAL: Primary | Chronic | ICD-10-CM

## 2025-04-07 DIAGNOSIS — M25.551 CHRONIC HIP PAIN, BILATERAL: Primary | Chronic | ICD-10-CM

## 2025-04-07 RX ORDER — MELOXICAM 15 MG/1
1 TABLET ORAL DAILY
COMMUNITY
Start: 2025-03-20

## 2025-04-07 RX ORDER — TRAMADOL HYDROCHLORIDE 50 MG/1
50 TABLET ORAL 2 TIMES DAILY
COMMUNITY

## 2025-04-07 NOTE — PROGRESS NOTES
J  U  N  O  H    K  I  M ,   M  D      I  N  T  E  R  N  A  L    M  E  D  I  C  I  N  E         ENCOUNTER DATE:  04/07/2025    Ady Grimes / 24 y.o. / male    OFFICE VISIT ENCOUNTER       CHIEF COMPLAINT / REASON FOR OFFICE VISIT     Bilateral hip pain (Bilateral hip pain (requests referral to [facility] for EDS Clinic))      ASSESSMENT & PLAN     1. Chronic hip pain, bilateral    2. Tear of acetabular labrum, unspecified laterality, initial encounter    3. Labral tear of hip, degenerative      Orders Placed This Encounter   Procedures    Ambulatory Referral to Orthopedic Surgery     Referral Priority:   Routine     Referral Type:   Consultation     Referral Reason:   Specialty Services Required     Requested Specialty:   Orthopedic Surgery     Number of Visits Requested:   1     No orders of the defined types were placed in this encounter.      SUMMARY/DISCUSSION  Referral placed to [facility] (Orthopedics/EDS Clinic) to rule of EDS.   Discussed pain medication choices. Discuss with prescribers about diclofenac/naproxen. If fails (or cannot tolerate) try celecoxib. Meloxicam was not helpful.   Continue tramadol sparingly for moderate to severe pain.   Follow-up with me as needed.       TOTAL TIME OF ENCOUNTER:    I spent time: 35 minutes in direct care of this patient on this date of service. This time includes time spent by me in the following activities:preparing for the visit, obtaining and/or reviewing a separately obtained history, performing a medically appropriate examination and/or evaluation , reviewing medical records, reviewing tests, ordering medications, tests, or procedures, counseling and educating the patient/family/caregiver, documenting information in the medical record, reviewing office note/correspondence from other providers, referring and communicating with other health care professionals , and care coordination.     Next Appointment with me: Visit date not found    No follow-ups  "on file.      VITAL SIGNS     Vitals:    04/07/25 1012   BP: 126/82   Pulse: 75   Temp: 97.1 °F (36.2 °C)   SpO2: 99%   Weight: 102 kg (225 lb)   Height: 177.8 cm (70\")       BP Readings from Last 3 Encounters:   04/07/25 126/82   05/14/24 140/90   02/12/24 148/96     Wt Readings from Last 3 Encounters:   04/07/25 102 kg (225 lb)   05/14/24 101 kg (222 lb)   02/12/24 103 kg (226 lb)     Body mass index is 32.28 kg/m².    Blood pressure readings recorded on patient flowsheet:       No data to display                MEDICATIONS AT THE TIME OF OFFICE VISIT     Current Outpatient Medications on File Prior to Visit   Medication Sig    meloxicam (MOBIC) 15 MG tablet Take 1 tablet by mouth Daily.    celecoxib (CeleBREX) 200 MG capsule Take 1 capsule by mouth Daily. (Patient not taking: Reported on 4/7/2025)    traMADol (ULTRAM) 50 MG tablet Take 1 tablet by mouth 2 (Two) Times a Day.     No current facility-administered medications on file prior to visit.          HISTORY OF PRESENT ILLNESS     History of Present Illness  The patient is a 24-year-old male who presents for evaluation of bilateral hip pain.    He reports history of chronic bilateral hip pain for multiple years as he has played sports including football when younger. He saw Dr. Hawk who performed bilateral labral repairs in the past for worsening hip pain (left> right) and subsequently underwent MRI imaging which showed recurrent tears of bilateral labrum (right greater than left).  Dr. Hawk apparently expressed concern about Ehrlos Danlos Syndrome and referred him to a rheumatologist (Dr. Enciso).  After laboratory workup she did not feel he had any indication of connective tissue disorder.  He then saw sports medicine specialist at [facility] who is currently in the process of further assessing his condition and reviewing previous records.  He was recommended to try Celebrex as meloxicam has not been effective in controlling the pain.  He is " "insurance denied coverage for Celebrex.  He has not tried 2 other Bueno inhibitors prescription.  He denies significant GI irritation symptoms.  He takes tramadol sparingly as needed for moderate to severe pain he is conscious of limiting this because he does not wish to develop dependence.  His quality of life is severely impacted by his condition due to the severity of pain.  He has difficulty with ADLs and ambulates using a crutch.       REVIEW OF SYSTEMS     GI negative         PHYSICAL EXAMINATION     Physical Exam  Alert with normal thought and judgment.   No acute distress   Normal affect and mood.   Uses a crutch to ambulate       REVIEWED DATA     Labs:     Lab Results   Component Value Date     05/14/2024    K 4.7 05/14/2024    CALCIUM 9.1 05/14/2024    AST 27 05/14/2024    ALT 27 05/14/2024    BUN 10 05/14/2024    CREATININE 0.80 05/14/2024    CREATININE 0.80 02/07/2024    CREATININE 0.75 04/05/2022    EGFR 126.0 05/14/2024     Lab Results   Component Value Date    HGBA1C 5.00 02/07/2024    HGBA1C 4.90 10/16/2019   No results found for: \"MALBCRERATIO\"  Lab Results   Component Value Date     (H) 05/14/2024    LDL 93 02/07/2024     (H) 10/16/2019    HDL 38 (L) 05/14/2024    HDL 27 (L) 02/07/2024    TRIG 50 05/14/2024    TRIG 156 (H) 02/07/2024     Lab Results   Component Value Date    TSH 0.983 02/07/2024    TSH 1.130 10/16/2019    FREET4 1.23 02/07/2024    FREET4 1.36 10/16/2019     Lab Results   Component Value Date    WBC 6.92 02/07/2024    HGB 15.1 02/07/2024     02/07/2024         Imaging:     IMAGING SCANNED (11/07/2024)   IMAGING SCANNED (11/07/2024)   CT hip left without IV contrast (04/02/2025 16:58)         Medical Tests:           Summary of old records / correspondence / consultant report:     RHEUMATOLOGY - SCAN - FOLLOW UP, CORINNE JHA, 10/24/2024 (10/24/2024)     [facility] Medicine office note 3/12/25    Request outside records:         "

## 2025-04-07 NOTE — TELEPHONE ENCOUNTER
Orthopedic Surgery referral placed today to [facility]; the [facility] is unable to accept this pt due to his insurance. Gave the pt a call, but he didn't answer. LVM to call back.

## 2025-04-30 ENCOUNTER — TELEPHONE (OUTPATIENT)
Dept: INTERNAL MEDICINE | Age: 25
End: 2025-04-30
Payer: COMMERCIAL

## 2025-04-30 NOTE — TELEPHONE ENCOUNTER
SPOKE TO PATIENT AND HE INFORMED THAT HIS REFERRAL TO [facility] WAS DENIED DUE TO INSURANCE ISSUE, PATIENT NEED TO FIND OUT AN IN-NETWORK PROVIDER, PT STATE THIS REFERRAL NO LONGER NEEDED.